# Patient Record
Sex: FEMALE
[De-identification: names, ages, dates, MRNs, and addresses within clinical notes are randomized per-mention and may not be internally consistent; named-entity substitution may affect disease eponyms.]

---

## 2020-06-05 ENCOUNTER — HOSPITAL ENCOUNTER (EMERGENCY)
Dept: HOSPITAL 88 - FSED | Age: 40
Discharge: HOME | End: 2020-06-05
Payer: COMMERCIAL

## 2020-06-05 VITALS — HEIGHT: 63 IN | BODY MASS INDEX: 34.55 KG/M2 | WEIGHT: 195 LBS

## 2020-06-05 VITALS — SYSTOLIC BLOOD PRESSURE: 169 MMHG | DIASTOLIC BLOOD PRESSURE: 77 MMHG

## 2020-06-05 DIAGNOSIS — E11.9: ICD-10-CM

## 2020-06-05 DIAGNOSIS — I10: ICD-10-CM

## 2020-06-05 DIAGNOSIS — M54.16: Primary | ICD-10-CM

## 2020-06-05 PROCEDURE — 80048 BASIC METABOLIC PNL TOTAL CA: CPT

## 2020-06-05 PROCEDURE — 99283 EMERGENCY DEPT VISIT LOW MDM: CPT

## 2020-06-05 PROCEDURE — 74176 CT ABD & PELVIS W/O CONTRAST: CPT

## 2020-06-05 PROCEDURE — 85025 COMPLETE CBC W/AUTO DIFF WBC: CPT

## 2020-06-05 PROCEDURE — 81003 URINALYSIS AUTO W/O SCOPE: CPT

## 2020-06-05 PROCEDURE — 81025 URINE PREGNANCY TEST: CPT

## 2020-06-05 PROCEDURE — 80076 HEPATIC FUNCTION PANEL: CPT

## 2020-06-05 NOTE — DIAGNOSTIC IMAGING REPORT
CT of the abdomen and pelvis.



Comparison: None



Clinical History:  Back and hip pain and right leg pain down the

Night



Technique: Helical CT scan of the abdomen and pelvis was performed. 

Intravenous contrast administration was not utilized.  Oral contrast

administration was not utilized. Coronal and sagittal reconstructions were

generated from the raw data.  Multiple images were submitted for

interpretation.



This exam was performed according to our departmental dose-optimization program

which includes automated exposure control, adjustment of the mA and/or kV

according to patient size 



Discussion:



Inferior chest: Unremarkable. 

Liver:  Unremarkable

Spleen:  Unremarkable

Pancreas:  Unremarkable

Biliary tree and gallbladder:  Post cholecystectomy. Otherwise unremarkable

Adrenal glands:  Unremarkable

Kidneys and ureters:  Unremarkable

Vasculature:  Unremarkable except for minimal calcific atherosclerosis

Lymph nodes:  Unremarkable

Bowel:  Unremarkable

Pelvis: Urinary bladder is unremarkable. Internal genitalia unremarkable.

Possible right functional ovarian cyst. Sensitivity of noncontrast CT or pelvic

genitalia is low. Pelvic wall unremarkable.

Peritoneum: Unremarkable

Perineal compartments: unremarkable.

Fluid:  No free fluid. No free air

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

Body wall: Unremarkable



Impression:

No acute abnormality on this exam. Please see above regarding pelvic genitalia.

If there is a concern, pelvic ultrasonography may be performed.



Signed by: Christiano Ordonez MD on 6/5/2020 11:19 AM

## 2020-06-05 NOTE — EMERGENCY DEPARTMENT NOTE
History of Present Illnes


History of Present Illness


Chief Complaint:  Extremity Trauma/Pain


History of Present Illness


This is a 39 year old  female.  Complaining of lower back pain radiating

down the right leg for 2 days .


Historian:  Patient


Arrival Mode:  [facility]


EMS Treatment PTA:  See EMS Report


Onset (how long ago):  day(s) (for 2 days)


Radiation:  back, extremity (right lower extremity), flank (pain in the right 

flank)


Severity:  moderate


Onset quality:  gradual


Duration (how long):  day(s) (2 days)


Timing of current episode:  constant


Progression:  worsening


Relieving factors:  none


Exacerbating factors:  none


Associated symptoms:  denies other symptoms


Treatments prior to arrival:  NSAID (Motrin 600 mg by mouth every 6 hours 

without relief)


Previous service:  medications given





Past Medical/Family History


Physician Review


I have reviewed the patient's past medical and family history.  Any updates have

been documented here.





Past Medical History


Recent Fever:  No


Clinical Suspicion of Infectio:  No


New/Unexplained Change in Ment:  No


Past Medical History:  Hypertension, Diabetes


Other Medical History:  


Patient admits to being noncompliant with medications


Past Surgical History:  Cholecysctectomy, 





Social History


Smoking Cessation:  Never Smoker


Counseling Performed:  No


Alcohol Use:  None


Any Illegal Drug Use:  No


TB Exposure/Symptoms:  No


Physically hurt or threatened:  No





Family History


Family history of heart diseas:  No





Other


Last Tetanus:  unk


Any Pre-Existing Lines (PICC,:  No


Is patient up to date on immun:  No


Last Flu:  no


Last Pneumovax:  no





Review of Systems


Review of Systems


Constitutional:  no symptoms


EENTM:  no symptoms


Cardiovascular:  no symptoms


Respiratory:  no symptoms


Gastrointestinal:  no symptoms


Genitourinary:  no symptoms


Musculoskeletal:  no symptoms


Neurological:  no symptoms


Psychological:  no symptoms


Endocrine:  no symptoms


Hematological/Lymphatic:  no symptoms


Review of other systems


All other systems reviewed and negative.





Physical Exam


Related Data


Allergies:  


Coded Allergies:  


     No Known Allergies (Unverified , 20)


Triage Vital Signs





Vital Signs








  Date Time  Temp Pulse Resp B/P (MAP) Pulse Ox O2 Delivery O2 Flow Rate FiO2


 


20 09:40 98.2 76 18 180/78 100   








Vital signs reviewed:  Yes





Physical Exam


CONSTITUTIONAL





Constitutional:  well-developed, well-nourished, obese (mildly obese)


HENT


HENT:  normocephalic, atraumatic


EYES





Eyes:  PERRL, conjunctivae normal, EOM normal, lids normal


NECK


Neck:  ROM normal, supple


PULMONARY


Pulmonary:  effort normal, breath sounds normal


CARDIOVASCULAR





Cardiovascular:  regular rhythm, heart sounds normal, intact distal pulses, 

capillary refill normal, normal rate


GASTROINTESTINAL





Abdominal:  soft, nontender, bowel sounds normal, right CVA tenderness


GENITOURINARY





Genitourinary:  exam deferred


SKIN


Skin:  warm, dry


MUSCULOSKELETAL





Musculoskeletal:  ROM normal (pain is elicited in the lower back on the right 

side when the patient point tenderness of the lower back was noted), other 

(positive straight leg raises on the right side pain at approximately 30)


NEUROLOGICAL





Neurological:  alert, oriented x 3, DTRs normal, no gross motor or sensory 

deficits


PSYCHOLOGICAL


Psychological:  mood/affect normal, behavior normal, thought content normal, 

judgement normal





Results


Laboratory


Laboratory


Urinalysis significant for trace protein the pregnancy test is negative also 

specific gravity is greater than 1.030 otherwise urinalysis is negative. 

Electrolytes are significant for inhaled T of 91 and AST of 55 glucose of 143 

CBC is unremarkable





Imaging


Imaging results reviewed:  Yes


Impressions


Noncontrast CT of the abdomen and pelvis basically within normal limits except 

for a possible right functional ovarian cyst





Critical Care Time


Subsequent provider


I assumed direction of critical care for this patient from another provider of 

my specialty.





Assessment & Plan


Reassessment


Reassessment time:  11:51


Reassessment


Feeling better





Assessment & Plan


Final Impression:  


(1) RADICULOPATHY, LUMBAR REGION


Assessment & Plan


Taking medicines as prescribed follow with your family doctor in 48-72 hours or 

return to the ER for any further concerns


Depart Disposition:  HOME, SELF-CARE


Last Vital Signs











  Date Time  Temp Pulse Resp B/P (MAP) Pulse Ox O2 Delivery O2 Flow Rate FiO2


 


20 09:40 98.2 76 18 180/78 100   








Home Meds


Active Scripts


Acetaminophen With Codeine (TYLENOL WITH CODEINE #3 TABLET) 1 Each Tablet, 300 

MG PO QID for pain for 3 Days, #12 TAB 0 Refills


   Prov:ANG DOYLE MD         20


Cyclobenzaprine Hcl (FLEXERIL) 5 Mg Tablet, 1 TAB PO TID for back pain for 5 

Days, #15 0 Refills


   Prov:ANG DOYLE MD         20











ANG DOYLE MD              2020 10:13